# Patient Record
Sex: FEMALE | Race: WHITE | NOT HISPANIC OR LATINO | URBAN - METROPOLITAN AREA
[De-identification: names, ages, dates, MRNs, and addresses within clinical notes are randomized per-mention and may not be internally consistent; named-entity substitution may affect disease eponyms.]

---

## 2017-01-17 ENCOUNTER — OFFICE (OUTPATIENT)
Dept: URBAN - METROPOLITAN AREA CLINIC 14 | Facility: CLINIC | Age: 37
End: 2017-01-17

## 2017-01-17 VITALS
SYSTOLIC BLOOD PRESSURE: 123 MMHG | HEIGHT: 65 IN | WEIGHT: 217 LBS | HEART RATE: 76 BPM | DIASTOLIC BLOOD PRESSURE: 77 MMHG

## 2017-01-17 DIAGNOSIS — K57.32 DIVERTICULITIS OF LARGE INTESTINE WITHOUT PERFORATION OR ABS: ICD-10-CM

## 2017-01-17 DIAGNOSIS — R10.84 GENERALIZED ABDOMINAL PAIN: ICD-10-CM

## 2017-01-17 DIAGNOSIS — R10.13 EPIGASTRIC PAIN: ICD-10-CM

## 2017-01-17 DIAGNOSIS — E66.9 OBESITY, UNSPECIFIED: ICD-10-CM

## 2017-01-17 PROCEDURE — 99214 OFFICE O/P EST MOD 30 MIN: CPT | Performed by: NURSE PRACTITIONER

## 2017-01-17 RX ORDER — CIPROFLOXACIN HYDROCHLORIDE 500 MG/1
TABLET, FILM COATED ORAL
Qty: 20 | Refills: 0 | Status: COMPLETED
Start: 2017-01-17 | End: 2018-07-05

## 2017-01-17 RX ORDER — METRONIDAZOLE 500 MG/1
TABLET, FILM COATED ORAL
Qty: 20 | Refills: 0 | Status: COMPLETED
Start: 2017-01-17 | End: 2018-07-05

## 2017-01-17 RX ORDER — PANTOPRAZOLE SODIUM 40 MG/1
40 TABLET, DELAYED RELEASE ORAL
Qty: 30 | Refills: 1 | Status: COMPLETED
Start: 2017-01-17 | End: 2018-07-05

## 2018-07-05 ENCOUNTER — OFFICE (OUTPATIENT)
Dept: URBAN - METROPOLITAN AREA CLINIC 14 | Facility: CLINIC | Age: 38
End: 2018-07-05

## 2018-07-05 VITALS
WEIGHT: 223 LBS | HEART RATE: 86 BPM | SYSTOLIC BLOOD PRESSURE: 117 MMHG | DIASTOLIC BLOOD PRESSURE: 71 MMHG | HEIGHT: 65 IN

## 2018-07-05 DIAGNOSIS — R10.84 GENERALIZED ABDOMINAL PAIN: ICD-10-CM

## 2018-07-05 LAB
CBC, PLATELET, NO DIFFERENTIAL: HEMATOCRIT: 42.8 % (ref 34–46.6)
CBC, PLATELET, NO DIFFERENTIAL: HEMOGLOBIN: 14.4 G/DL (ref 11.1–15.9)
CBC, PLATELET, NO DIFFERENTIAL: MCH: 30.9 PG (ref 26.6–33)
CBC, PLATELET, NO DIFFERENTIAL: MCHC: 33.6 G/DL (ref 31.5–35.7)
CBC, PLATELET, NO DIFFERENTIAL: MCV: 92 FL (ref 79–97)
CBC, PLATELET, NO DIFFERENTIAL: NRBC: (no result)
CBC, PLATELET, NO DIFFERENTIAL: PLATELETS: 247 X10E3/UL (ref 150–379)
CBC, PLATELET, NO DIFFERENTIAL: RBC: 4.66 X10E6/UL (ref 3.77–5.28)
CBC, PLATELET, NO DIFFERENTIAL: RDW: 13.2 % (ref 12.3–15.4)
CBC, PLATELET, NO DIFFERENTIAL: WBC: 13.2 X10E3/UL — HIGH (ref 3.4–10.8)
COMP. METABOLIC PANEL (14): A/G RATIO: 1.9 (ref 1.2–2.2)
COMP. METABOLIC PANEL (14): ALBUMIN: 4.5 G/DL (ref 3.5–5.5)
COMP. METABOLIC PANEL (14): ALKALINE PHOSPHATASE: 68 IU/L (ref 39–117)
COMP. METABOLIC PANEL (14): ALT (SGPT): 12 IU/L (ref 0–32)
COMP. METABOLIC PANEL (14): AST (SGOT): 18 IU/L (ref 0–40)
COMP. METABOLIC PANEL (14): BILIRUBIN, TOTAL: 0.6 MG/DL (ref 0–1.2)
COMP. METABOLIC PANEL (14): BUN/CREATININE RATIO: 16 (ref 9–23)
COMP. METABOLIC PANEL (14): BUN: 12 MG/DL (ref 6–24)
COMP. METABOLIC PANEL (14): CALCIUM: 9.6 MG/DL (ref 8.7–10.2)
COMP. METABOLIC PANEL (14): CARBON DIOXIDE, TOTAL: 24 MMOL/L (ref 20–29)
COMP. METABOLIC PANEL (14): CHLORIDE: 100 MMOL/L (ref 96–106)
COMP. METABOLIC PANEL (14): CREATININE: 0.75 MG/DL (ref 0.57–1)
COMP. METABOLIC PANEL (14): EGFR IF AFRICN AM: 115 ML/MIN/1.73 (ref 59–?)
COMP. METABOLIC PANEL (14): EGFR IF NONAFRICN AM: 100 ML/MIN/1.73 (ref 59–?)
COMP. METABOLIC PANEL (14): GLOBULIN, TOTAL: 2.4 G/DL (ref 1.5–4.5)
COMP. METABOLIC PANEL (14): GLUCOSE: 80 MG/DL (ref 65–99)
COMP. METABOLIC PANEL (14): POTASSIUM: 4.4 MMOL/L (ref 3.5–5.2)
COMP. METABOLIC PANEL (14): PROTEIN, TOTAL: 6.9 G/DL (ref 6–8.5)
COMP. METABOLIC PANEL (14): SODIUM: 143 MMOL/L (ref 134–144)
LIPASE: 17 U/L (ref 14–72)

## 2018-07-05 PROCEDURE — 99214 OFFICE O/P EST MOD 30 MIN: CPT | Performed by: INTERNAL MEDICINE

## 2018-07-05 RX ORDER — CIPROFLOXACIN 500 MG/1
TABLET, FILM COATED ORAL
Qty: 20 | Refills: 0 | Status: COMPLETED
Start: 2018-07-05 | End: 2018-08-20

## 2018-07-05 RX ORDER — METRONIDAZOLE 500 MG/1
TABLET, FILM COATED ORAL
Qty: 20 | Refills: 0 | Status: COMPLETED
Start: 2018-07-05 | End: 2018-08-20

## 2018-07-05 NOTE — SERVICEHPINOTES
Ms. Diaz was worked in today due to severe abdominal pain. Pain started 2 days ago and is very intense. Pain is mostly in the left mid abd and right lower quadrant. She also felt very warm last night and believes she had fever. She denies melena or hematochezia but started having diarrhea today. She denies recent antibiotics and denies sick contacts. She has not had diverticulitis in over 1 year.

## 2018-08-20 ENCOUNTER — OFFICE (OUTPATIENT)
Dept: URBAN - METROPOLITAN AREA CLINIC 14 | Facility: CLINIC | Age: 38
End: 2018-08-20

## 2018-08-20 VITALS
WEIGHT: 225 LBS | DIASTOLIC BLOOD PRESSURE: 66 MMHG | SYSTOLIC BLOOD PRESSURE: 117 MMHG | HEART RATE: 61 BPM | HEIGHT: 65 IN

## 2018-08-20 DIAGNOSIS — K57.32 DIVERTICULITIS OF LARGE INTESTINE WITHOUT PERFORATION OR ABS: ICD-10-CM

## 2018-08-20 LAB
CBC, PLATELET, NO DIFFERENTIAL: HEMATOCRIT: 41.4 % (ref 34–46.6)
CBC, PLATELET, NO DIFFERENTIAL: HEMOGLOBIN: 13.5 G/DL (ref 11.1–15.9)
CBC, PLATELET, NO DIFFERENTIAL: MCH: 30.4 PG (ref 26.6–33)
CBC, PLATELET, NO DIFFERENTIAL: MCHC: 32.6 G/DL (ref 31.5–35.7)
CBC, PLATELET, NO DIFFERENTIAL: MCV: 93 FL (ref 79–97)
CBC, PLATELET, NO DIFFERENTIAL: PLATELETS: 292 X10E3/UL (ref 150–379)
CBC, PLATELET, NO DIFFERENTIAL: RBC: 4.44 X10E6/UL (ref 3.77–5.28)
CBC, PLATELET, NO DIFFERENTIAL: RDW: 13.7 % (ref 12.3–15.4)
CBC, PLATELET, NO DIFFERENTIAL: WBC: 6.6 X10E3/UL (ref 3.4–10.8)
T-TRANSGLUTAMINASE (TTG) IGA: <2 U/ML

## 2018-08-20 PROCEDURE — 99213 OFFICE O/P EST LOW 20 MIN: CPT | Performed by: INTERNAL MEDICINE

## 2018-08-20 RX ORDER — HYOSCYAMINE SULFATE 0.12 MG/1
TABLET ORAL; SUBLINGUAL
Qty: 50 | Refills: 1 | Status: COMPLETED
Start: 2018-08-20 | End: 2018-10-01

## 2018-09-10 ENCOUNTER — OFFICE (OUTPATIENT)
Dept: URBAN - METROPOLITAN AREA CLINIC 14 | Facility: CLINIC | Age: 38
End: 2018-09-10

## 2018-09-26 ENCOUNTER — INPATIENT HOSPITAL (OUTPATIENT)
Dept: URBAN - METROPOLITAN AREA HOSPITAL 130 | Facility: HOSPITAL | Age: 38
End: 2018-09-26

## 2018-09-26 DIAGNOSIS — K57.32 DIVERTICULITIS OF LARGE INTESTINE WITHOUT PERFORATION OR ABS: ICD-10-CM

## 2018-09-26 PROCEDURE — 99254 IP/OBS CNSLTJ NEW/EST MOD 60: CPT | Performed by: INTERNAL MEDICINE

## 2018-09-27 PROCEDURE — 99232 SBSQ HOSP IP/OBS MODERATE 35: CPT | Performed by: INTERNAL MEDICINE

## 2018-10-01 ENCOUNTER — OFFICE (OUTPATIENT)
Dept: URBAN - METROPOLITAN AREA CLINIC 14 | Facility: CLINIC | Age: 38
End: 2018-10-01

## 2018-10-01 VITALS
HEART RATE: 59 BPM | SYSTOLIC BLOOD PRESSURE: 112 MMHG | DIASTOLIC BLOOD PRESSURE: 66 MMHG | HEIGHT: 65 IN | WEIGHT: 221 LBS

## 2018-10-01 DIAGNOSIS — K58.9 IRRITABLE BOWEL SYNDROME WITHOUT DIARRHEA: ICD-10-CM

## 2018-10-01 DIAGNOSIS — K57.32 DIVERTICULITIS OF LARGE INTESTINE WITHOUT PERFORATION OR ABS: ICD-10-CM

## 2018-10-01 DIAGNOSIS — Z83.71 FAMILY HISTORY OF COLONIC POLYPS: ICD-10-CM

## 2018-10-01 DIAGNOSIS — E66.9 OBESITY, UNSPECIFIED: ICD-10-CM

## 2018-10-01 PROCEDURE — 99213 OFFICE O/P EST LOW 20 MIN: CPT

## 2018-11-06 ENCOUNTER — INPATIENT HOSPITAL (OUTPATIENT)
Dept: URBAN - METROPOLITAN AREA HOSPITAL 130 | Facility: HOSPITAL | Age: 38
End: 2018-11-06
Payer: COMMERCIAL

## 2018-11-06 DIAGNOSIS — K57.32 DIVERTICULITIS OF LARGE INTESTINE WITHOUT PERFORATION OR ABS: ICD-10-CM

## 2018-11-06 PROCEDURE — 99254 IP/OBS CNSLTJ NEW/EST MOD 60: CPT | Performed by: INTERNAL MEDICINE

## 2018-11-07 PROCEDURE — 99232 SBSQ HOSP IP/OBS MODERATE 35: CPT | Performed by: INTERNAL MEDICINE

## 2018-11-08 PROCEDURE — 99231 SBSQ HOSP IP/OBS SF/LOW 25: CPT | Performed by: INTERNAL MEDICINE

## 2019-03-05 ENCOUNTER — AMBULATORY SURGICAL CENTER (OUTPATIENT)
Dept: URBAN - METROPOLITAN AREA SURGERY 3 | Facility: SURGERY | Age: 39
End: 2019-03-05
Payer: COMMERCIAL

## 2019-03-05 ENCOUNTER — AMBULATORY SURGICAL CENTER (OUTPATIENT)
Dept: URBAN - METROPOLITAN AREA SURGERY 3 | Facility: SURGERY | Age: 39
End: 2019-03-05

## 2019-03-05 VITALS
OXYGEN SATURATION: 98 % | DIASTOLIC BLOOD PRESSURE: 61 MMHG | TEMPERATURE: 98 F | OXYGEN SATURATION: 97 % | OXYGEN SATURATION: 97 % | OXYGEN SATURATION: 96 % | SYSTOLIC BLOOD PRESSURE: 101 MMHG | SYSTOLIC BLOOD PRESSURE: 127 MMHG | DIASTOLIC BLOOD PRESSURE: 69 MMHG | HEIGHT: 65 IN | TEMPERATURE: 98.5 F | HEART RATE: 100 BPM | HEART RATE: 78 BPM | SYSTOLIC BLOOD PRESSURE: 111 MMHG | SYSTOLIC BLOOD PRESSURE: 117 MMHG | WEIGHT: 210 LBS | HEART RATE: 78 BPM | HEART RATE: 100 BPM | SYSTOLIC BLOOD PRESSURE: 117 MMHG | OXYGEN SATURATION: 96 % | DIASTOLIC BLOOD PRESSURE: 81 MMHG | HEART RATE: 85 BPM | HEART RATE: 87 BPM | HEIGHT: 65 IN | RESPIRATION RATE: 20 BRPM | SYSTOLIC BLOOD PRESSURE: 127 MMHG | HEART RATE: 91 BPM | HEART RATE: 91 BPM | RESPIRATION RATE: 20 BRPM | SYSTOLIC BLOOD PRESSURE: 99 MMHG | TEMPERATURE: 98 F | DIASTOLIC BLOOD PRESSURE: 81 MMHG | OXYGEN SATURATION: 98 % | DIASTOLIC BLOOD PRESSURE: 69 MMHG | HEART RATE: 87 BPM | SYSTOLIC BLOOD PRESSURE: 101 MMHG | RESPIRATION RATE: 18 BRPM | HEART RATE: 85 BPM | SYSTOLIC BLOOD PRESSURE: 111 MMHG | DIASTOLIC BLOOD PRESSURE: 64 MMHG | TEMPERATURE: 98.5 F | WEIGHT: 210 LBS | DIASTOLIC BLOOD PRESSURE: 64 MMHG | DIASTOLIC BLOOD PRESSURE: 61 MMHG | DIASTOLIC BLOOD PRESSURE: 62 MMHG | DIASTOLIC BLOOD PRESSURE: 62 MMHG | RESPIRATION RATE: 18 BRPM | SYSTOLIC BLOOD PRESSURE: 99 MMHG

## 2019-03-05 DIAGNOSIS — K57.30 DIVERTICULOSIS OF LARGE INTESTINE WITHOUT PERFORATION OR ABS: ICD-10-CM

## 2019-03-05 DIAGNOSIS — R93.3 ABNORMAL FINDINGS ON DIAGNOSTIC IMAGING OF OTHER PARTS OF DI: ICD-10-CM

## 2019-03-05 DIAGNOSIS — Z83.71 FAMILY HISTORY OF COLONIC POLYPS: ICD-10-CM

## 2019-03-05 PROBLEM — Z12.11 SCREENING FOR COLONIC NEOPLASIA: Status: ACTIVE | Noted: 2019-03-05

## 2019-03-05 PROCEDURE — 45378 DIAGNOSTIC COLONOSCOPY: CPT

## 2019-05-25 ENCOUNTER — INPATIENT HOSPITAL (OUTPATIENT)
Dept: URBAN - METROPOLITAN AREA HOSPITAL 130 | Facility: HOSPITAL | Age: 39
End: 2019-05-25

## 2019-05-25 DIAGNOSIS — K57.32 DIVERTICULITIS OF LARGE INTESTINE WITHOUT PERFORATION OR ABS: ICD-10-CM

## 2019-05-25 PROCEDURE — 99254 IP/OBS CNSLTJ NEW/EST MOD 60: CPT | Performed by: INTERNAL MEDICINE

## 2019-05-26 PROCEDURE — 99231 SBSQ HOSP IP/OBS SF/LOW 25: CPT | Performed by: INTERNAL MEDICINE

## 2021-07-26 ENCOUNTER — OFFICE (OUTPATIENT)
Dept: URBAN - METROPOLITAN AREA CLINIC 14 | Facility: CLINIC | Age: 41
End: 2021-07-26

## 2021-07-26 VITALS
WEIGHT: 232 LBS | DIASTOLIC BLOOD PRESSURE: 72 MMHG | OXYGEN SATURATION: 95 % | HEIGHT: 65 IN | HEART RATE: 85 BPM | RESPIRATION RATE: 18 BRPM | SYSTOLIC BLOOD PRESSURE: 116 MMHG

## 2021-07-26 DIAGNOSIS — R10.12 LEFT UPPER QUADRANT PAIN: ICD-10-CM

## 2021-07-26 PROCEDURE — 99214 OFFICE O/P EST MOD 30 MIN: CPT | Performed by: NURSE PRACTITIONER
